# Patient Record
Sex: MALE | Race: WHITE | NOT HISPANIC OR LATINO | URBAN - METROPOLITAN AREA
[De-identification: names, ages, dates, MRNs, and addresses within clinical notes are randomized per-mention and may not be internally consistent; named-entity substitution may affect disease eponyms.]

---

## 2019-01-22 ENCOUNTER — OFFICE (OUTPATIENT)
Dept: URBAN - METROPOLITAN AREA CLINIC 33 | Facility: CLINIC | Age: 34
End: 2019-01-22

## 2019-01-22 VITALS
TEMPERATURE: 97.7 F | WEIGHT: 172 LBS | HEIGHT: 68 IN | HEART RATE: 67 BPM | SYSTOLIC BLOOD PRESSURE: 136 MMHG | DIASTOLIC BLOOD PRESSURE: 90 MMHG

## 2019-01-22 DIAGNOSIS — K63.5 POLYP OF COLON: ICD-10-CM

## 2019-01-22 DIAGNOSIS — R10.31 RIGHT LOWER QUADRANT PAIN: ICD-10-CM

## 2019-01-22 DIAGNOSIS — R10.30 LOWER ABDOMINAL PAIN, UNSPECIFIED: ICD-10-CM

## 2019-01-22 DIAGNOSIS — K64.8 OTHER HEMORRHOIDS: ICD-10-CM

## 2019-01-22 DIAGNOSIS — R10.32 LEFT LOWER QUADRANT PAIN: ICD-10-CM

## 2019-01-22 DIAGNOSIS — R19.8 OTHER SPECIFIED SYMPTOMS AND SIGNS INVOLVING THE DIGESTIVE S: ICD-10-CM

## 2019-01-22 PROCEDURE — 99243 OFF/OP CNSLTJ NEW/EST LOW 30: CPT

## 2019-01-22 NOTE — SERVICEHPINOTES
SEYMOUR CONDON   is a   33   year old male who is being seen in consultation at the request of   HEENA CHA   for RLQ and LLQ abdominal pain/tightness. This has been going on intermittently for the past three weeks. Symptoms can be in LLQ some days and RLQ other days. No specific exacerbating or relieving factors. He has not noticed any hernias. He has noted softer stools but thinks they may be more flat than usual.  No relief of discomfort after defecation. Pain/discomfort does not wake him up in the middle of the night. He denies any melena, rectal bleeding, loss of appetite, weight loss, dysphagia, nausea, vomiting, urinary complaints, fevers/chills/night sweats. He has occasional heartburn. He also has noted two months of "inflammation" in rectal area. He finds it difficult to sit at times without discomfort. He does not notice any abnormalities when wiping. No itching or bleeding. He feels that abdominal tightness/discomfort is similar to several years ago when he had normal bloodwork and colonoscopy. He did have his appendix removed in 2017.

## 2019-08-07 ENCOUNTER — OFFICE (OUTPATIENT)
Dept: URBAN - METROPOLITAN AREA CLINIC 101 | Facility: CLINIC | Age: 34
End: 2019-08-07

## 2019-08-07 VITALS
DIASTOLIC BLOOD PRESSURE: 83 MMHG | TEMPERATURE: 99.5 F | HEART RATE: 79 BPM | SYSTOLIC BLOOD PRESSURE: 138 MMHG | HEIGHT: 68 IN | WEIGHT: 166 LBS

## 2019-08-07 DIAGNOSIS — R10.32 LEFT LOWER QUADRANT PAIN: ICD-10-CM

## 2019-08-07 DIAGNOSIS — R93.5 ABNORMAL FINDINGS ON DIAGNOSTIC IMAGING OF OTHER ABDOMINAL R: ICD-10-CM

## 2019-08-07 PROCEDURE — 99214 OFFICE O/P EST MOD 30 MIN: CPT

## 2019-08-07 NOTE — SERVICEHPINOTES
SEYMOUR CONDON   is a   34   male who complains of abd pain. He reports LLQ discomfort ongoing since January or so. Tried to increase fiber in diet which has not really made a difference.. 2 weeks ago started having pulsing in LLQ felt like a spasm and would occur once every 5 minutes ot could go hours without issues. no aggravating factors. BMs vary between 4, 6. once daily. Pain is not changed with BMs.Joseph had a CT scan abd/pelvis with contrast on 8/2/19 which revealed narrowing of left colon most likely secondary to peristalsis.Joseph had a colonoscopy in 2013 with hyperplastic polyp, bx neg for colitis.

## 2019-08-09 ENCOUNTER — OFFICE (OUTPATIENT)
Dept: URBAN - METROPOLITAN AREA CLINIC 100 | Facility: CLINIC | Age: 34
End: 2019-08-09

## 2019-08-09 VITALS
SYSTOLIC BLOOD PRESSURE: 123 MMHG | RESPIRATION RATE: 8 BRPM | SYSTOLIC BLOOD PRESSURE: 88 MMHG | RESPIRATION RATE: 19 BRPM | RESPIRATION RATE: 18 BRPM | HEART RATE: 98 BPM | OXYGEN SATURATION: 97 % | TEMPERATURE: 97.7 F | RESPIRATION RATE: 7 BRPM | HEART RATE: 79 BPM | TEMPERATURE: 98.2 F | OXYGEN SATURATION: 99 % | DIASTOLIC BLOOD PRESSURE: 72 MMHG | HEART RATE: 78 BPM | RESPIRATION RATE: 16 BRPM | DIASTOLIC BLOOD PRESSURE: 84 MMHG | OXYGEN SATURATION: 100 % | DIASTOLIC BLOOD PRESSURE: 63 MMHG | DIASTOLIC BLOOD PRESSURE: 62 MMHG | DIASTOLIC BLOOD PRESSURE: 68 MMHG | OXYGEN SATURATION: 98 % | DIASTOLIC BLOOD PRESSURE: 66 MMHG | HEART RATE: 82 BPM | HEIGHT: 68 IN | WEIGHT: 166 LBS | SYSTOLIC BLOOD PRESSURE: 97 MMHG | HEART RATE: 81 BPM | HEART RATE: 74 BPM | SYSTOLIC BLOOD PRESSURE: 119 MMHG | SYSTOLIC BLOOD PRESSURE: 117 MMHG

## 2019-08-09 DIAGNOSIS — R10.32 LEFT LOWER QUADRANT PAIN: ICD-10-CM

## 2019-08-09 DIAGNOSIS — R93.5 ABNORMAL FINDINGS ON DIAGNOSTIC IMAGING OF OTHER ABDOMINAL R: ICD-10-CM

## 2019-08-09 PROCEDURE — 45378 DIAGNOSTIC COLONOSCOPY: CPT
